# Patient Record
Sex: FEMALE | URBAN - NONMETROPOLITAN AREA
[De-identification: names, ages, dates, MRNs, and addresses within clinical notes are randomized per-mention and may not be internally consistent; named-entity substitution may affect disease eponyms.]

---

## 2021-06-03 ENCOUNTER — APPOINTMENT (RX ONLY)
Dept: URBAN - NONMETROPOLITAN AREA CLINIC 28 | Facility: CLINIC | Age: 56
Setting detail: DERMATOLOGY
End: 2021-06-03

## 2021-06-03 DIAGNOSIS — Z00.8 ENCOUNTER FOR OTHER GENERAL EXAMINATION: ICD-10-CM

## 2021-06-03 DIAGNOSIS — L82.0 INFLAMED SEBORRHEIC KERATOSIS: ICD-10-CM

## 2021-06-03 DIAGNOSIS — B07.8 OTHER VIRAL WARTS: ICD-10-CM

## 2021-06-03 PROCEDURE — ? LIQUID NITROGEN

## 2021-06-03 PROCEDURE — ? COUNSELING

## 2021-06-03 PROCEDURE — ? PRESCRIPTION

## 2021-06-03 PROCEDURE — 99203 OFFICE O/P NEW LOW 30 MIN: CPT | Mod: 25

## 2021-06-03 PROCEDURE — ? OTHER

## 2021-06-03 PROCEDURE — 17110 DESTRUCTION B9 LES UP TO 14: CPT

## 2021-06-03 PROCEDURE — ? DEFER

## 2021-06-03 RX ORDER — PHARMACY COMPOUNDING ACCESSORY
EACH MISCELLANEOUS
Qty: 1 | Refills: 1 | Status: ERX | COMMUNITY
Start: 2021-06-03

## 2021-06-03 RX ADMIN — Medication: at 00:00

## 2021-06-03 ASSESSMENT — LOCATION DETAILED DESCRIPTION DERM
LOCATION DETAILED: RIGHT MIDDLE FINGERTIP
LOCATION DETAILED: RIGHT LATERAL TRAPEZIAL NECK

## 2021-06-03 ASSESSMENT — LOCATION SIMPLE DESCRIPTION DERM
LOCATION SIMPLE: POSTERIOR NECK
LOCATION SIMPLE: RIGHT MIDDLE FINGER

## 2021-06-03 ASSESSMENT — LOCATION ZONE DERM
LOCATION ZONE: NECK
LOCATION ZONE: FINGER

## 2021-06-03 NOTE — PROCEDURE: LIQUID NITROGEN
Render Note In Bullet Format When Appropriate: No
Post-Care Instructions: I reviewed with the patient in detail post-care instructions. Patient is to wear sunprotection, and avoid picking at any of the treated lesions. Pt may apply Vaseline to crusted or scabbing areas.
Render Post-Care Instructions In Note?: yes
Medical Necessity Clause: This procedure was medically necessary because the lesions that were treated were:
Consent: The patient's consent was obtained including but not limited to risks of crusting, scabbing, blistering, scarring, darker or lighter pigmentary change, recurrence, incomplete removal and infection.
Detail Level: Detailed
Medical Necessity Information: It is in your best interest to select a reason for this procedure from the list below. All of these items fulfill various CMS LCD requirements except the new and changing color options.

## 2021-06-03 NOTE — PROCEDURE: DEFER
Procedure To Be Performed At Next Visit: Cryotherapy
Detail Level: Detailed
Introduction Text (Please End With A Colon): The following procedure was deferred:
Triangulation (Location Of Lesion In Relation To Distance From Anatomical Landmarks): The lesion is just under the nail.

## 2021-06-14 ENCOUNTER — APPOINTMENT (RX ONLY)
Dept: URBAN - NONMETROPOLITAN AREA CLINIC 28 | Facility: CLINIC | Age: 56
Setting detail: DERMATOLOGY
End: 2021-06-14

## 2021-06-14 DIAGNOSIS — Z00.8 ENCOUNTER FOR OTHER GENERAL EXAMINATION: ICD-10-CM

## 2021-06-14 DIAGNOSIS — B07.8 OTHER VIRAL WARTS: ICD-10-CM | Status: IMPROVED

## 2021-06-14 PROCEDURE — ? OTHER

## 2021-06-14 PROCEDURE — ? COUNSELING

## 2021-06-14 PROCEDURE — ? LIQUID NITROGEN

## 2021-06-14 PROCEDURE — 17110 DESTRUCTION B9 LES UP TO 14: CPT

## 2021-06-14 PROCEDURE — ? TREATMENT REGIMEN

## 2021-06-14 ASSESSMENT — LOCATION ZONE DERM: LOCATION ZONE: FINGER

## 2021-06-14 ASSESSMENT — LOCATION DETAILED DESCRIPTION DERM: LOCATION DETAILED: RIGHT MIDDLE FINGERTIP

## 2021-06-14 ASSESSMENT — LOCATION SIMPLE DESCRIPTION DERM: LOCATION SIMPLE: RIGHT MIDDLE FINGER

## 2021-06-14 NOTE — PROCEDURE: TREATMENT REGIMEN
Detail Level: Simple
Continue Regimen: pharmacy compounding accessory \\nQuantity: 1.0 Container  Days Supply: 30\\nSig: Efudex 3% + Salicylic Acid 25%   Apply to warts QHS cover with bandaid

## 2021-06-14 NOTE — PROCEDURE: LIQUID NITROGEN
Show Applicator Variable?: Yes
Medical Necessity Clause: This procedure was medically necessary because the lesions that were treated were:
Post-Care Instructions: I reviewed with the patient in detail post-care instructions. Patient is to wear sunprotection, and avoid picking at any of the treated lesions. Pt may apply Vaseline to crusted or scabbing areas.
Consent: The patient's consent was obtained including but not limited to risks of crusting, scabbing, blistering, scarring, darker or lighter pigmentary change, recurrence, incomplete removal and infection.
Detail Level: Detailed
Include Z78.9 (Other Specified Conditions Influencing Health Status) As An Associated Diagnosis?: No
Medical Necessity Information: It is in your best interest to select a reason for this procedure from the list below. All of these items fulfill various CMS LCD requirements except the new and changing color options.

## 2021-07-12 ENCOUNTER — APPOINTMENT (RX ONLY)
Dept: URBAN - NONMETROPOLITAN AREA CLINIC 28 | Facility: CLINIC | Age: 56
Setting detail: DERMATOLOGY
End: 2021-07-12

## 2021-07-12 DIAGNOSIS — B07.8 OTHER VIRAL WARTS: ICD-10-CM

## 2021-07-12 DIAGNOSIS — Z00.8 ENCOUNTER FOR OTHER GENERAL EXAMINATION: ICD-10-CM

## 2021-07-12 PROCEDURE — ? TREATMENT REGIMEN

## 2021-07-12 PROCEDURE — ? OTHER

## 2021-07-12 PROCEDURE — ? COUNSELING

## 2021-07-12 PROCEDURE — 17110 DESTRUCTION B9 LES UP TO 14: CPT

## 2021-07-12 PROCEDURE — ? BENIGN DESTRUCTION

## 2021-07-12 ASSESSMENT — LOCATION ZONE DERM: LOCATION ZONE: FINGER

## 2021-07-12 ASSESSMENT — LOCATION DETAILED DESCRIPTION DERM: LOCATION DETAILED: RIGHT MIDDLE FINGERTIP

## 2021-07-12 ASSESSMENT — LOCATION SIMPLE DESCRIPTION DERM: LOCATION SIMPLE: RIGHT MIDDLE FINGER

## 2021-07-12 NOTE — PROCEDURE: TREATMENT REGIMEN
Continue Regimen: pharmacy compounding accessory \\nQuantity: 1.0 Container  Days Supply: 30\\nSig: Efudex 3% + Salicylic Acid 25%   Apply to warts QHS cover with bandaid